# Patient Record
Sex: FEMALE | Race: WHITE | NOT HISPANIC OR LATINO | URBAN - METROPOLITAN AREA
[De-identification: names, ages, dates, MRNs, and addresses within clinical notes are randomized per-mention and may not be internally consistent; named-entity substitution may affect disease eponyms.]

---

## 2017-05-11 ENCOUNTER — APPOINTMENT (RX ONLY)
Dept: URBAN - METROPOLITAN AREA CLINIC 41 | Facility: CLINIC | Age: 37
Setting detail: DERMATOLOGY
End: 2017-05-11

## 2017-05-11 DIAGNOSIS — D22 MELANOCYTIC NEVI: ICD-10-CM

## 2017-05-11 DIAGNOSIS — L81.4 OTHER MELANIN HYPERPIGMENTATION: ICD-10-CM

## 2017-05-11 DIAGNOSIS — D18.0 HEMANGIOMA: ICD-10-CM

## 2017-05-11 DIAGNOSIS — B07.8 OTHER VIRAL WARTS: ICD-10-CM

## 2017-05-11 PROBLEM — D22.9 MELANOCYTIC NEVI, UNSPECIFIED: Status: ACTIVE | Noted: 2017-05-11

## 2017-05-11 PROBLEM — D18.01 HEMANGIOMA OF SKIN AND SUBCUTANEOUS TISSUE: Status: ACTIVE | Noted: 2017-05-11

## 2017-05-11 PROBLEM — D48.5 NEOPLASM OF UNCERTAIN BEHAVIOR OF SKIN: Status: ACTIVE | Noted: 2017-05-11

## 2017-05-11 PROCEDURE — 99203 OFFICE O/P NEW LOW 30 MIN: CPT

## 2017-05-11 PROCEDURE — ? COUNSELING

## 2017-05-11 ASSESSMENT — LOCATION DETAILED DESCRIPTION DERM
LOCATION DETAILED: RIGHT ANKLE
LOCATION DETAILED: LEFT INFERIOR LATERAL MIDBACK
LOCATION DETAILED: RIGHT INFERIOR CENTRAL MALAR CHEEK

## 2017-05-11 ASSESSMENT — LOCATION ZONE DERM
LOCATION ZONE: LEG
LOCATION ZONE: TRUNK
LOCATION ZONE: FACE

## 2017-05-11 ASSESSMENT — LOCATION SIMPLE DESCRIPTION DERM
LOCATION SIMPLE: RIGHT ANKLE
LOCATION SIMPLE: RIGHT CHEEK
LOCATION SIMPLE: LEFT LOWER BACK

## 2020-07-01 ENCOUNTER — APPOINTMENT (RX ONLY)
Dept: URBAN - METROPOLITAN AREA CLINIC 41 | Facility: CLINIC | Age: 40
Setting detail: DERMATOLOGY
End: 2020-07-01

## 2020-07-01 VITALS — TEMPERATURE: 97.7 F

## 2020-07-01 DIAGNOSIS — D22 MELANOCYTIC NEVI: ICD-10-CM

## 2020-07-01 DIAGNOSIS — I78.8 OTHER DISEASES OF CAPILLARIES: ICD-10-CM

## 2020-07-01 DIAGNOSIS — Z71.89 OTHER SPECIFIED COUNSELING: ICD-10-CM

## 2020-07-01 DIAGNOSIS — L55.9 SUNBURN, UNSPECIFIED: ICD-10-CM

## 2020-07-01 PROBLEM — D22.61 MELANOCYTIC NEVI OF RIGHT UPPER LIMB, INCLUDING SHOULDER: Status: ACTIVE | Noted: 2020-07-01

## 2020-07-01 PROBLEM — D22.5 MELANOCYTIC NEVI OF TRUNK: Status: ACTIVE | Noted: 2020-07-01

## 2020-07-01 PROCEDURE — 99213 OFFICE O/P EST LOW 20 MIN: CPT

## 2020-07-01 PROCEDURE — ? COUNSELING

## 2020-07-01 ASSESSMENT — LOCATION ZONE DERM
LOCATION ZONE: FACE
LOCATION ZONE: TRUNK
LOCATION ZONE: LEG
LOCATION ZONE: ARM

## 2020-07-01 ASSESSMENT — LOCATION SIMPLE DESCRIPTION DERM
LOCATION SIMPLE: RIGHT THIGH
LOCATION SIMPLE: RIGHT CHEEK
LOCATION SIMPLE: LEFT BREAST
LOCATION SIMPLE: ABDOMEN
LOCATION SIMPLE: LEFT THIGH
LOCATION SIMPLE: RIGHT UPPER ARM

## 2020-07-01 ASSESSMENT — LOCATION DETAILED DESCRIPTION DERM
LOCATION DETAILED: RIGHT ANTERIOR PROXIMAL THIGH
LOCATION DETAILED: LEFT MEDIAL BREAST 10-11:00 REGION
LOCATION DETAILED: LEFT LATERAL ABDOMEN
LOCATION DETAILED: RIGHT LATERAL ABDOMEN
LOCATION DETAILED: RIGHT ANTERIOR PROXIMAL UPPER ARM
LOCATION DETAILED: RIGHT CENTRAL MALAR CHEEK
LOCATION DETAILED: LEFT ANTERIOR PROXIMAL THIGH

## 2020-07-01 NOTE — HPI: SKIN LESIONS
Is This A New Presentation, Or A Follow-Up?: Skin Lesion
How Severe Is Your Skin Lesion?: mild
Have Your Skin Lesions Been Treated?: not been treated
Which Family Member (Optional)?: Uncle, grandma and grandpa

## 2020-07-01 NOTE — PROCEDURE: COUNSELING
Detail Level: Zone
Detail Level: Detailed
Patient Specific Counseling (Will Not Stick From Patient To Patient): CeraVe moisturizing cream for the peeling skin
Detail Level: Generalized
Detail Level: Simple

## 2021-06-18 ENCOUNTER — APPOINTMENT (RX ONLY)
Dept: URBAN - METROPOLITAN AREA CLINIC 41 | Facility: CLINIC | Age: 41
Setting detail: DERMATOLOGY
End: 2021-06-18

## 2021-06-18 DIAGNOSIS — D485 NEOPLASM OF UNCERTAIN BEHAVIOR OF SKIN: ICD-10-CM

## 2021-06-18 DIAGNOSIS — Z71.89 OTHER SPECIFIED COUNSELING: ICD-10-CM

## 2021-06-18 PROBLEM — D48.5 NEOPLASM OF UNCERTAIN BEHAVIOR OF SKIN: Status: ACTIVE | Noted: 2021-06-18

## 2021-06-18 PROCEDURE — ? COUNSELING

## 2021-06-18 PROCEDURE — 11102 TANGNTL BX SKIN SINGLE LES: CPT

## 2021-06-18 PROCEDURE — ? BIOPSY BY SHAVE METHOD

## 2021-06-18 PROCEDURE — 99212 OFFICE O/P EST SF 10 MIN: CPT | Mod: 25

## 2021-06-18 ASSESSMENT — LOCATION DETAILED DESCRIPTION DERM: LOCATION DETAILED: RIGHT ANTERIOR PROXIMAL UPPER ARM

## 2021-06-18 ASSESSMENT — LOCATION SIMPLE DESCRIPTION DERM: LOCATION SIMPLE: RIGHT UPPER ARM

## 2021-06-18 ASSESSMENT — LOCATION ZONE DERM: LOCATION ZONE: ARM

## 2021-06-18 NOTE — PROCEDURE: BIOPSY BY SHAVE METHOD
Detail Level: Detailed
Depth Of Biopsy: dermis
Was A Bandage Applied: Yes
Size Of Lesion In Cm: 0
Biopsy Type: H and E
Biopsy Method: double edge Personna blade
Anesthesia Type: 1% lidocaine with epinephrine
Anesthesia Volume In Cc: 1
Hemostasis: Electrocautery
Wound Care: Vaseline
Dressing: Band-Aid
Destruction After The Procedure: No
Type Of Destruction Used: Electrodesiccation and Curettage
Cryotherapy Text: The wound bed was treated with cryotherapy after the biopsy was performed.
Electrodesiccation Text: The wound bed was treated with electrodesiccation after the biopsy was performed.
Electrodesiccation And Curettage Text: The wound bed was treated with electrodesiccation and curettage after the biopsy was performed.
Silver Nitrate Text: The wound bed was treated with silver nitrate after the biopsy was performed.
Lab: [id number]
Lab Facility: [id number]
Consent: Verbal consent was obtained and risks were reviewed including but not limited to scarring, infection, bleeding, scabbing, incomplete removal, nerve damage and allergy to anesthesia.
Post-Care Instructions: I reviewed with the patient in detail post-care instructions. Patient is to keep the biopsy site dry overnight, and then apply vaseline twice daily until healed.
Notification Instructions: Patient will be notified of biopsy results. However, patient instructed to call the office if not contacted within 2 weeks.
Billing Type: Third-Party Bill
Information: Selecting Yes will display possible errors in your note based on the variables you have selected. This validation is only offered as a suggestion for you. PLEASE NOTE THAT THE VALIDATION TEXT WILL BE REMOVED WHEN YOU FINALIZE YOUR NOTE. IF YOU WANT TO FAX A PRELIMINARY NOTE YOU WILL NEED TO TOGGLE THIS TO 'NO' IF YOU DO NOT WANT IT IN YOUR FAXED NOTE.

## 2021-06-18 NOTE — PROCEDURE: COUNSELING
Detail Level: Generalized
Sunscreen Recommendations: Mineral based sunscreen daily to face, neck, and chest

## 2021-09-27 ENCOUNTER — APPOINTMENT (RX ONLY)
Dept: URBAN - METROPOLITAN AREA CLINIC 2 | Facility: CLINIC | Age: 41
Setting detail: DERMATOLOGY
End: 2021-09-27

## 2021-09-27 DIAGNOSIS — Z71.89 OTHER SPECIFIED COUNSELING: ICD-10-CM

## 2021-09-27 DIAGNOSIS — L71.8 OTHER ROSACEA: ICD-10-CM | Status: STABLE

## 2021-09-27 DIAGNOSIS — Z85.820 PERSONAL HISTORY OF MALIGNANT MELANOMA OF SKIN: ICD-10-CM | Status: STABLE

## 2021-09-27 DIAGNOSIS — L57.0 ACTINIC KERATOSIS: ICD-10-CM

## 2021-09-27 PROCEDURE — ? COUNSELING

## 2021-09-27 PROCEDURE — 99213 OFFICE O/P EST LOW 20 MIN: CPT | Mod: 25

## 2021-09-27 PROCEDURE — ? RECOMMENDATIONS

## 2021-09-27 PROCEDURE — 17003 DESTRUCT PREMALG LES 2-14: CPT

## 2021-09-27 PROCEDURE — ? LIQUID NITROGEN

## 2021-09-27 PROCEDURE — 17000 DESTRUCT PREMALG LESION: CPT

## 2021-09-27 ASSESSMENT — LOCATION SIMPLE DESCRIPTION DERM
LOCATION SIMPLE: RIGHT UPPER ARM
LOCATION SIMPLE: NOSE
LOCATION SIMPLE: SUPERIOR FOREHEAD

## 2021-09-27 ASSESSMENT — LOCATION ZONE DERM
LOCATION ZONE: NOSE
LOCATION ZONE: ARM
LOCATION ZONE: FACE

## 2021-09-27 ASSESSMENT — LOCATION DETAILED DESCRIPTION DERM
LOCATION DETAILED: RIGHT ANTERIOR PROXIMAL UPPER ARM
LOCATION DETAILED: NASAL DORSUM
LOCATION DETAILED: NASAL ROOT
LOCATION DETAILED: SUPERIOR MID FOREHEAD

## 2021-09-27 NOTE — PROCEDURE: RECOMMENDATIONS
Recommendations (Free Text): Mineral based zinc oxide 50 SPF recommended for face daily
Detail Level: Generalized
Recommendation Preamble: The following recommendations were made during the visit: continual photo protection with sun screen and photo protective clothing. Discussed importance of reapplying after 90 minutes.
Render Risk Assessment In Note?: yes

## 2021-09-27 NOTE — PROCEDURE: COUNSELING
Sunscreen Recommendations: Discussed importance of continual photo protection with zinc based sunscreen and photo protective clothing.
Detail Level: Zone
Detail Level: Generalized
Skin Checks Recommendations: Photo protective clothing, sunscreens containing zinc & titanium minimum of 30spf
Sunscreen Recommendations: Discussed importance of regular photo protection with zinc based sun screen and photo protective clothing.
Detail Level: Simple
Sunscreen Recommendations: Minimum 30 spf daily
Moisturizer Recommendations: CeraVe or Cetaphil moisturizer
Patient Specific Counseling (Will Not Stick From Patient To Patient): -Early on set rosacea was discussed \\n-Different treatment options were discussed and triggers that could possibly set on a flare up and worsen symptoms
Cleanser Recommendations: CeraVe or Cetaphil gentle wash

## 2021-09-27 NOTE — PROCEDURE: LIQUID NITROGEN
Show Aperture Variable?: Yes
Render Post-Care Instructions In Note?: no
Number Of Freeze-Thaw Cycles: 2 freeze-thaw cycles
Consent: The patient's consent was obtained including but not limited to risks of crusting, scabbing, blistering, scarring, darker or lighter pigmentary change, recurrence, incomplete removal and infection.
Duration Of Freeze Thaw-Cycle (Seconds): 2
Detail Level: Simple
Post-Care Instructions: I reviewed with the patient in detail post-care instructions. Patient is to wear sunprotection, and avoid picking at any of the treated lesions. Pt may apply Vaseline to crusted or scabbing areas.

## 2021-10-08 ENCOUNTER — APPOINTMENT (RX ONLY)
Dept: URBAN - METROPOLITAN AREA CLINIC 41 | Facility: CLINIC | Age: 41
Setting detail: DERMATOLOGY
End: 2021-10-08

## 2021-10-08 DIAGNOSIS — L81.4 OTHER MELANIN HYPERPIGMENTATION: ICD-10-CM

## 2021-10-08 DIAGNOSIS — D22 MELANOCYTIC NEVI: ICD-10-CM

## 2021-10-08 DIAGNOSIS — Z85.820 PERSONAL HISTORY OF MALIGNANT MELANOMA OF SKIN: ICD-10-CM

## 2021-10-08 DIAGNOSIS — Z71.89 OTHER SPECIFIED COUNSELING: ICD-10-CM

## 2021-10-08 PROBLEM — D22.5 MELANOCYTIC NEVI OF TRUNK: Status: ACTIVE | Noted: 2021-10-08

## 2021-10-08 PROCEDURE — ? COUNSELING

## 2021-10-08 PROCEDURE — 99213 OFFICE O/P EST LOW 20 MIN: CPT

## 2021-10-08 ASSESSMENT — LOCATION DETAILED DESCRIPTION DERM
LOCATION DETAILED: LEFT INFERIOR UPPER BACK
LOCATION DETAILED: RIGHT ANTERIOR PROXIMAL UPPER ARM
LOCATION DETAILED: RIGHT INFERIOR UPPER BACK

## 2021-10-08 ASSESSMENT — LOCATION ZONE DERM
LOCATION ZONE: TRUNK
LOCATION ZONE: ARM

## 2021-10-08 ASSESSMENT — LOCATION SIMPLE DESCRIPTION DERM
LOCATION SIMPLE: RIGHT UPPER ARM
LOCATION SIMPLE: RIGHT UPPER BACK
LOCATION SIMPLE: LEFT UPPER BACK

## 2021-10-08 NOTE — HPI: MELANOMA F/U (HISTORY OF MALIGNANT MELANOMA)
What Is The Reason For Today's Visit?: History of Melanoma
What Stage Is The Melanoma?: other
Year Excised?: 06/2021

## 2021-10-08 NOTE — PROCEDURE: COUNSELING
Quality 137: Melanoma: Continuity Of Care - Recall System: Patient information entered into a recall system that includes: target date for the next exam specified AND a process to follow up with patients regarding missed or unscheduled appointments
When Should The Patient Follow-Up For Their Next Full-Body Skin Exam?: 3 Months
Sunscreen Recommendations: Continual photo protection with mineral based sunscreen and photo protective clothing.
Detail Level: Simple
Sunscreen Recommendations: Mineral based sunscreens containing zinc and titanium
Detail Level: Generalized
Ipl Recommendations: Discussing with  pricing for IPL treatment
Sunscreen Recommendations: Regular use of mineral based sunscreen and photo protective clothing
Topical Retinoids Recommendations: Retinoids are recommended to help with cell turnover and helps stimulate collagen
Laser Recommendations: Discussing with  pricing for laser treatment
Skin Checks Recommendations: Watch to make sure all your spots look similar for you
Sunscreen Recommendations: Mineral based sunscreen daily to face, neck, and chest

## 2022-01-04 ENCOUNTER — APPOINTMENT (RX ONLY)
Dept: URBAN - METROPOLITAN AREA CLINIC 41 | Facility: CLINIC | Age: 42
Setting detail: DERMATOLOGY
End: 2022-01-04

## 2022-01-04 DIAGNOSIS — Z71.89 OTHER SPECIFIED COUNSELING: ICD-10-CM

## 2022-01-04 DIAGNOSIS — L81.3 CAFÉ AU LAIT SPOTS: ICD-10-CM

## 2022-01-04 DIAGNOSIS — D22 MELANOCYTIC NEVI: ICD-10-CM

## 2022-01-04 DIAGNOSIS — L57.0 ACTINIC KERATOSIS: ICD-10-CM

## 2022-01-04 DIAGNOSIS — L81.4 OTHER MELANIN HYPERPIGMENTATION: ICD-10-CM

## 2022-01-04 DIAGNOSIS — Z85.820 PERSONAL HISTORY OF MALIGNANT MELANOMA OF SKIN: ICD-10-CM

## 2022-01-04 PROBLEM — D22.5 MELANOCYTIC NEVI OF TRUNK: Status: ACTIVE | Noted: 2022-01-04

## 2022-01-04 PROCEDURE — ? LIQUID NITROGEN

## 2022-01-04 PROCEDURE — 99213 OFFICE O/P EST LOW 20 MIN: CPT | Mod: 25

## 2022-01-04 PROCEDURE — 17000 DESTRUCT PREMALG LESION: CPT

## 2022-01-04 PROCEDURE — ? COUNSELING

## 2022-01-04 ASSESSMENT — LOCATION ZONE DERM
LOCATION ZONE: TRUNK
LOCATION ZONE: NOSE
LOCATION ZONE: ARM

## 2022-01-04 ASSESSMENT — LOCATION DETAILED DESCRIPTION DERM
LOCATION DETAILED: NASAL DORSUM
LOCATION DETAILED: RIGHT POSTERIOR SHOULDER
LOCATION DETAILED: RIGHT SUPERIOR MEDIAL MIDBACK
LOCATION DETAILED: LEFT SUPERIOR MEDIAL MIDBACK
LOCATION DETAILED: RIGHT MID-UPPER BACK

## 2022-01-04 ASSESSMENT — LOCATION SIMPLE DESCRIPTION DERM
LOCATION SIMPLE: NOSE
LOCATION SIMPLE: RIGHT LOWER BACK
LOCATION SIMPLE: LEFT LOWER BACK
LOCATION SIMPLE: RIGHT SHOULDER
LOCATION SIMPLE: RIGHT UPPER BACK

## 2022-01-04 NOTE — HPI: MELANOMA F/U (HISTORY OF MALIGNANT MELANOMA)
What Is The Reason For Today's Visit?: History of Melanoma
What Stage Is The Melanoma?: other
Year Excised?: 6/2021

## 2022-01-04 NOTE — PROCEDURE: COUNSELING
When Should The Patient Follow-Up For Their Next Full-Body Skin Exam?: 3 Months
Sunscreen Recommendations: Continual photo protection with mineral based sunscreen and photo protective clothing.
Detail Level: Simple
Quality 137: Melanoma: Continuity Of Care - Recall System: Patient information entered into a recall system that includes: target date for the next exam specified AND a process to follow up with patients regarding missed or unscheduled appointments
Sunscreen Recommendations: Mineral based sunscreens containing zinc and titanium
Detail Level: Generalized
Topical Retinoids Recommendations: Retinoids are recommended to help with cell turnover and helps stimulate collagen
Laser Recommendations: Discussing with  pricing for laser treatment
Sunscreen Recommendations: Regular use of mineral based sunscreen and photo protective clothing
Ipl Recommendations: Discussing with  pricing for IPL treatment
Skin Checks Recommendations: Watch to make sure all your spots look similar for you
Sunscreen Recommendations: Mineral based sunscreen daily to face, neck, and chest

## 2022-01-04 NOTE — PROCEDURE: LIQUID NITROGEN
Post-Care Instructions: I reviewed with the patient in detail post-care instructions. Patient is to wear sunprotection, and avoid picking at any of the treated lesions. Pt may apply Vaseline to crusted or scabbing areas.
Render Note In Bullet Format When Appropriate: No
Show Applicator Variable?: Yes
Consent: The patient's consent was obtained including but not limited to risks of crusting, scabbing, blistering, scarring, darker or lighter pigmentary change, recurrence, incomplete removal and infection.
Detail Level: Detailed
Duration Of Freeze Thaw-Cycle (Seconds): 0

## 2022-04-05 ENCOUNTER — APPOINTMENT (RX ONLY)
Dept: URBAN - METROPOLITAN AREA CLINIC 41 | Facility: CLINIC | Age: 42
Setting detail: DERMATOLOGY
End: 2022-04-05

## 2022-04-05 DIAGNOSIS — L81.4 OTHER MELANIN HYPERPIGMENTATION: ICD-10-CM

## 2022-04-05 DIAGNOSIS — Z71.89 OTHER SPECIFIED COUNSELING: ICD-10-CM

## 2022-04-05 DIAGNOSIS — D22 MELANOCYTIC NEVI: ICD-10-CM

## 2022-04-05 DIAGNOSIS — Z85.820 PERSONAL HISTORY OF MALIGNANT MELANOMA OF SKIN: ICD-10-CM

## 2022-04-05 PROBLEM — D22.5 MELANOCYTIC NEVI OF TRUNK: Status: ACTIVE | Noted: 2022-04-05

## 2022-04-05 PROCEDURE — ? COUNSELING

## 2022-04-05 PROCEDURE — 99213 OFFICE O/P EST LOW 20 MIN: CPT

## 2022-04-05 ASSESSMENT — LOCATION SIMPLE DESCRIPTION DERM
LOCATION SIMPLE: RIGHT UPPER BACK
LOCATION SIMPLE: LEFT UPPER BACK
LOCATION SIMPLE: RIGHT SHOULDER

## 2022-04-05 ASSESSMENT — LOCATION ZONE DERM
LOCATION ZONE: TRUNK
LOCATION ZONE: ARM

## 2022-04-05 ASSESSMENT — LOCATION DETAILED DESCRIPTION DERM
LOCATION DETAILED: LEFT MEDIAL UPPER BACK
LOCATION DETAILED: RIGHT POSTERIOR SHOULDER
LOCATION DETAILED: RIGHT MID-UPPER BACK

## 2022-04-05 NOTE — PROCEDURE: COUNSELING
When Should The Patient Follow-Up For Their Next Full-Body Skin Exam?: 3 Months
Sunscreen Recommendations: Continual photo protection with mineral based sunscreen and photo protective clothing.
Detail Level: Simple
Quality 137: Melanoma: Continuity Of Care - Recall System: Patient information entered into a recall system that includes: target date for the next exam specified AND a process to follow up with patients regarding missed or unscheduled appointments
Topical Retinoids Recommendations: Retinoids are recommended to help with cell turnover and helps stimulate collagen
Laser Recommendations: Discussing with  pricing for laser treatment
Sunscreen Recommendations: Regular use of mineral based sunscreen and photo protective clothing
Detail Level: Generalized
Ipl Recommendations: Discussing with  pricing for IPL treatment
Skin Checks Recommendations: Watch to make sure all your spots look similar for you
Sunscreen Recommendations: Mineral based sunscreen daily to face, neck, and chest
Sunscreen Recommendations: Mineral based sunscreens containing zinc and titanium to face, neck, and chest

## 2022-09-02 ENCOUNTER — APPOINTMENT (RX ONLY)
Dept: URBAN - METROPOLITAN AREA CLINIC 41 | Facility: CLINIC | Age: 42
Setting detail: DERMATOLOGY
End: 2022-09-02

## 2022-09-02 DIAGNOSIS — Z71.89 OTHER SPECIFIED COUNSELING: ICD-10-CM

## 2022-09-02 DIAGNOSIS — L81.4 OTHER MELANIN HYPERPIGMENTATION: ICD-10-CM

## 2022-09-02 DIAGNOSIS — Z85.820 PERSONAL HISTORY OF MALIGNANT MELANOMA OF SKIN: ICD-10-CM

## 2022-09-02 DIAGNOSIS — D22 MELANOCYTIC NEVI: ICD-10-CM

## 2022-09-02 PROBLEM — D22.5 MELANOCYTIC NEVI OF TRUNK: Status: ACTIVE | Noted: 2022-09-02

## 2022-09-02 PROCEDURE — 99213 OFFICE O/P EST LOW 20 MIN: CPT

## 2022-09-02 PROCEDURE — ? COUNSELING

## 2022-09-02 ASSESSMENT — LOCATION ZONE DERM
LOCATION ZONE: TRUNK
LOCATION ZONE: ARM

## 2022-09-02 ASSESSMENT — LOCATION DETAILED DESCRIPTION DERM
LOCATION DETAILED: RIGHT MID-UPPER BACK
LOCATION DETAILED: RIGHT POSTERIOR SHOULDER
LOCATION DETAILED: LEFT MEDIAL UPPER BACK

## 2022-09-02 ASSESSMENT — LOCATION SIMPLE DESCRIPTION DERM
LOCATION SIMPLE: RIGHT SHOULDER
LOCATION SIMPLE: RIGHT UPPER BACK
LOCATION SIMPLE: LEFT UPPER BACK

## 2022-09-02 NOTE — PROCEDURE: COUNSELING
When Should The Patient Follow-Up For Their Next Full-Body Skin Exam?: 6 Months
Sunscreen Recommendations: Continual photo protection with mineral based sunscreen and photo protective clothing.
Detail Level: Simple
Quality 137: Melanoma: Continuity Of Care - Recall System: Patient information entered into a recall system that includes: target date for the next exam specified AND a process to follow up with patients regarding missed or unscheduled appointments
Sunscreen Recommendations: Mineral based sunscreens containing zinc and titanium to face, neck, and chest
Detail Level: Generalized
Topical Retinoids Recommendations: Retinoids are recommended to help with cell turnover and helps stimulate collagen
Laser Recommendations: Discussing with  pricing for laser treatment
Sunscreen Recommendations: Regular use of mineral based sunscreen and photo protective clothing
Ipl Recommendations: Discussing with  pricing for IPL treatment
Skin Checks Recommendations: Watch to make sure all your spots look similar for you
Sunscreen Recommendations: Mineral based sunscreen daily to face, neck, and chest

## 2023-03-01 ENCOUNTER — APPOINTMENT (RX ONLY)
Dept: URBAN - METROPOLITAN AREA CLINIC 41 | Facility: CLINIC | Age: 43
Setting detail: DERMATOLOGY
End: 2023-03-01

## 2023-03-01 DIAGNOSIS — L44.8 OTHER SPECIFIED PAPULOSQUAMOUS DISORDERS: ICD-10-CM

## 2023-03-01 DIAGNOSIS — Z85.820 PERSONAL HISTORY OF MALIGNANT MELANOMA OF SKIN: ICD-10-CM

## 2023-03-01 DIAGNOSIS — D22 MELANOCYTIC NEVI: ICD-10-CM

## 2023-03-01 PROBLEM — D22.5 MELANOCYTIC NEVI OF TRUNK: Status: ACTIVE | Noted: 2023-03-01

## 2023-03-01 PROCEDURE — 99213 OFFICE O/P EST LOW 20 MIN: CPT

## 2023-03-01 PROCEDURE — ? COUNSELING

## 2023-03-01 ASSESSMENT — LOCATION SIMPLE DESCRIPTION DERM
LOCATION SIMPLE: CHEST
LOCATION SIMPLE: UPPER BACK

## 2023-03-01 ASSESSMENT — LOCATION DETAILED DESCRIPTION DERM
LOCATION DETAILED: UPPER STERNUM
LOCATION DETAILED: INFERIOR THORACIC SPINE

## 2023-03-01 ASSESSMENT — LOCATION ZONE DERM: LOCATION ZONE: TRUNK

## 2023-03-01 NOTE — PROCEDURE: COUNSELING
Detail Level: Simple
Sunscreen Recommendations: Regular photo protection with zinc based sunscreen and photo protective clothing.
When Should The Patient Follow-Up For Their Next Full-Body Skin Exam?: 6 Months
Quality 137: Melanoma: Continuity Of Care - Recall System: Patient information entered into a recall system that includes: target date for the next exam specified AND a process to follow up with patients regarding missed or unscheduled appointments
Detail Level: Zone
Sunscreen Recommendations: Mineral based sunscreens containing zinc and titanium to face, neck, and chest

## 2023-03-01 NOTE — HPI: MELANOMA F/U (HISTORY OF MALIGNANT MELANOMA)
What Is The Reason For Today's Visit?: History of Melanoma
What Stage Is The Melanoma?: Stage IB
Breslow Depth?: 0.9
Year Excised?: 2021